# Patient Record
Sex: MALE | Race: WHITE | Employment: FULL TIME | URBAN - METROPOLITAN AREA
[De-identification: names, ages, dates, MRNs, and addresses within clinical notes are randomized per-mention and may not be internally consistent; named-entity substitution may affect disease eponyms.]

---

## 2019-05-23 ENCOUNTER — HOSPITAL ENCOUNTER (EMERGENCY)
Age: 19
Discharge: HOME | End: 2019-05-23
Payer: COMMERCIAL

## 2019-05-23 VITALS
SYSTOLIC BLOOD PRESSURE: 155 MMHG | HEIGHT: 70 IN | HEART RATE: 88 BPM | RESPIRATION RATE: 18 BRPM | BODY MASS INDEX: 33.64 KG/M2 | WEIGHT: 235 LBS | DIASTOLIC BLOOD PRESSURE: 92 MMHG | OXYGEN SATURATION: 99 % | TEMPERATURE: 97.4 F

## 2019-05-23 DIAGNOSIS — J01.90 ACUTE SINUSITIS, RECURRENCE NOT SPECIFIED, UNSPECIFIED LOCATION: Primary | ICD-10-CM

## 2019-05-23 PROCEDURE — 99283 EMERGENCY DEPT VISIT LOW MDM: CPT

## 2019-05-23 PROCEDURE — 6370000000 HC RX 637 (ALT 250 FOR IP): Performed by: PHYSICIAN ASSISTANT

## 2019-05-23 RX ORDER — PSEUDOEPHEDRINE HCL 120 MG/1
120 TABLET, FILM COATED, EXTENDED RELEASE ORAL EVERY MORNING
Qty: 5 TABLET | Refills: 0 | Status: SHIPPED | OUTPATIENT
Start: 2019-05-23 | End: 2019-05-28

## 2019-05-23 RX ORDER — PREDNISONE 10 MG/1
TABLET ORAL
Qty: 18 TABLET | Refills: 0 | Status: SHIPPED | OUTPATIENT
Start: 2019-05-23 | End: 2019-06-02

## 2019-05-23 RX ORDER — ACETAMINOPHEN 500 MG
1000 TABLET ORAL ONCE
Status: COMPLETED | OUTPATIENT
Start: 2019-05-23 | End: 2019-05-23

## 2019-05-23 RX ORDER — IBUPROFEN 600 MG/1
600 TABLET ORAL ONCE
Status: COMPLETED | OUTPATIENT
Start: 2019-05-23 | End: 2019-05-23

## 2019-05-23 RX ORDER — SULFACETAMIDE SODIUM 100 MG/ML
2 SOLUTION/ DROPS OPHTHALMIC 4 TIMES DAILY
Qty: 1 BOTTLE | Refills: 0 | Status: SHIPPED | OUTPATIENT
Start: 2019-05-23 | End: 2019-05-28

## 2019-05-23 RX ORDER — AMOXICILLIN AND CLAVULANATE POTASSIUM 875; 125 MG/1; MG/1
1 TABLET, FILM COATED ORAL 2 TIMES DAILY
Qty: 20 TABLET | Refills: 0 | Status: SHIPPED | OUTPATIENT
Start: 2019-05-23 | End: 2019-06-02

## 2019-05-23 RX ADMIN — IBUPROFEN 600 MG: 600 TABLET ORAL at 13:19

## 2019-05-23 RX ADMIN — ACETAMINOPHEN 1000 MG: 500 TABLET ORAL at 13:19

## 2019-05-23 ASSESSMENT — PAIN DESCRIPTION - PAIN TYPE: TYPE: ACUTE PAIN

## 2019-05-23 ASSESSMENT — PAIN DESCRIPTION - FREQUENCY: FREQUENCY: CONTINUOUS

## 2019-05-23 ASSESSMENT — PAIN DESCRIPTION - LOCATION: LOCATION: FACE

## 2019-05-23 ASSESSMENT — PAIN DESCRIPTION - DESCRIPTORS: DESCRIPTORS: ACHING

## 2019-05-23 ASSESSMENT — PAIN SCALES - GENERAL
PAINLEVEL_OUTOF10: 8
PAINLEVEL_OUTOF10: 7

## 2019-05-23 NOTE — ED PROVIDER NOTES
**[facility]  [facility]GENCY [facility]MENT [facility]NCOUNTER      Pt Name: Dayton Watt  EIV:[id number]  [place] 2000  Date of evaluation: 5/23/2019  Provider: Fabi Connolly PA-C      Chief Complaint:    Chief Complaint   Patient presents with    Sinusitis     pt states that he feels like he has had a sinus infection x 1 wk. Nursing Notes, Past Medical Hx, Past Surgical Hx, Social Hx, Allergies, and Family Hx were all reviewed and agreed with or any disagreements were addressed in the HPI.    HPI:  (Location, Duration, Timing, Severity,Quality, Assoc Sx, Context, Modifying factors)  This is a  22 y.o. male patient presenting with complaint of ongoing sinus congestion, postnasal drainage, frontal headache. States symptoms have progressed over the past week. No fevers or chills. Does have a frontal headache which is throbbing in character worse when he bends and leans forward. Also indicating some left conjunctival irritation and drainage with crusting this morning. Denies fevers, chills. No shown ship other chest pain. PastMedical/Surgical History:      Diagnosis Date    Asthma          Procedure Laterality Date    WISDOM TOOTH EXTRACTION         Medications:  Discharge Medication List as of 5/23/2019  1:09 PM            Review of Systems:  Review of Systems  Positives and Pertinent negatives as per HPI. Except as noted above in the ROS, problem specific ROS was completed and is negative. Physical Exam:  Physical Exam   Constitutional: He is oriented to person, place, and time. He appears well-developed and well-nourished. HENT:   Head: Normocephalic and atraumatic. Right Ear: Hearing, tympanic membrane, external ear and ear canal normal.   Left Ear: Hearing, tympanic membrane, external ear and ear canal normal.   Nose: Right sinus exhibits frontal sinus tenderness. Left sinus exhibits frontal sinus tenderness.    Eyes: Conjunctivae are normal. Right eye exhibits no discharge. Left eye exhibits discharge. No scleral icterus. No evidence of periorbital edema/swelling or erythema. Neck: Normal range of motion. Neck supple. No thyromegaly present. Cardiovascular: Normal rate, regular rhythm and normal heart sounds. Pulmonary/Chest: Effort normal and breath sounds normal.   Musculoskeletal: Normal range of motion. Lymphadenopathy:     He has no cervical adenopathy. Neurological: He is alert and oriented to person, place, and time. Skin: Skin is warm and dry. No rash noted. Psychiatric: He has a normal mood and affect. His behavior is normal. Judgment and thought content normal.   Nursing note and vitals reviewed. MEDICAL DECISION MAKING    Vitals:    Vitals:    05/23/19 1206   BP: (!) 155/92   Pulse: 88   Resp: 18   Temp: 97.4 °F (36.3 °C)   TempSrc: Oral   SpO2: 99%   Weight: 235 lb (106.6 kg)   Height: 5' 10\" (1.778 m)       LABS:Labs Reviewed - No data to display     Remainder of labs reviewed and werenegative at this time or not returned at the time of this note. RADIOLOGY:   Non-plain film images such as CT, Ultrasound and MRI are read by the radiologist. Don Merida PA-C have directly visualized the radiologic plain film image(s) with the below findings:    Not obtained    Interpretation per the Radiologist below, if available at the time of thisnote:    No orders to display        No results found. MEDICAL DECISION MAKING / ED COURSE:      PROCEDURES:   Procedures    None    Patient was given:  Medications   ibuprofen (ADVIL;MOTRIN) tablet 600 mg (600 mg Oral Given 5/23/19 1319)   acetaminophen (TYLENOL) tablet 1,000 mg (1,000 mg Oral Given 5/23/19 1319)       The patient presenting with what appears to be in acute sinusitis with associated or probable associated left conjunctivitis. I did prescribe Augmentin, prednisone, Bleph-10 eye drops and Sudafed.   The patient expresses understanding of his diagnosis and the treatment plan. I find no indication at this time to perform CT imaging. He will follow with his PCP if not improved over the next several days. The patient tolerated their visit well. I evaluated the patient. The physician was available for consultation as needed. The patient and / or the family were informed of the results of anytests, a time was given to answer questions, a plan was proposed and they agreed with plan. CLINICAL IMPRESSION:  1.  Acute sinusitis, recurrence not specified, unspecified location        DISPOSITION Decision To Discharge 05/23/2019 01:07:30 PM      PATIENT REFERRED TO:  [facility] [address]  [place] [facility]  Schedule an appointment as soon as possible for a visit in 4 week      [facility] ED  [address] [address]  [phone]  Go to   If symptoms worsen    [facility]) Pre-Services  [phone]  Schedule an appointment as soon as possible for a visit in 1 week        DISCHARGE MEDICATIONS:  Discharge Medication List as of 5/23/2019  1:09 PM      START taking these medications    Details   predniSONE (DELTASONE) 10 MG tablet 3 tabs po qam for 3 days then 2 tabs qam for 3 days the 1 tab qam for 3 days, Disp-18 tablet, R-0Print      pseudoephedrine (SUDAFED 12 HOUR) 120 MG extended release tablet Take 1 tablet by mouth every morning for 5 doses, Disp-5 tablet, R-0Print      amoxicillin-clavulanate (AUGMENTIN) 875-125 MG per tablet Take 1 tablet by mouth 2 times daily for 10 days, Disp-20 tablet, R-0Print      sulfacetamide (BLEPH-10) 10 % ophthalmic solution Place 2 drops into the left eye 4 times daily for 5 days, Disp-1 Bottle, R-0Print             DISCONTINUED MEDICATIONS:  Discharge Medication List as of 5/23/2019  1:09 PM                 (Please note the MDM and HPI sections of this note were completed with a voice recognition program.  Efforts weremade to edit the dictations but

## 2022-03-02 ENCOUNTER — HOSPITAL ENCOUNTER (EMERGENCY)
Age: 22
Discharge: HOME | End: 2022-03-02
Payer: COMMERCIAL

## 2022-03-02 VITALS
OXYGEN SATURATION: 99 % | WEIGHT: 240 LBS | TEMPERATURE: 97.5 F | DIASTOLIC BLOOD PRESSURE: 84 MMHG | SYSTOLIC BLOOD PRESSURE: 141 MMHG | HEART RATE: 72 BPM | RESPIRATION RATE: 16 BRPM | HEIGHT: 70 IN | BODY MASS INDEX: 34.36 KG/M2

## 2022-03-02 DIAGNOSIS — R03.0 ELEVATED BLOOD PRESSURE READING: ICD-10-CM

## 2022-03-02 DIAGNOSIS — S61.211A LACERATION OF LEFT INDEX FINGER W/O FOREIGN BODY W/O DAMAGE TO NAIL, INITIAL ENCOUNTER: Primary | ICD-10-CM

## 2022-03-02 PROCEDURE — 6370000000 HC RX 637 (ALT 250 FOR IP): Performed by: PHYSICIAN ASSISTANT

## 2022-03-02 PROCEDURE — 99284 EMERGENCY DEPT VISIT MOD MDM: CPT

## 2022-03-02 RX ORDER — CEPHALEXIN 500 MG/1
500 CAPSULE ORAL 4 TIMES DAILY
Qty: 28 CAPSULE | Refills: 0 | Status: SHIPPED | OUTPATIENT
Start: 2022-03-02 | End: 2022-03-09

## 2022-03-02 RX ORDER — CEPHALEXIN 500 MG/1
500 CAPSULE ORAL ONCE
Status: COMPLETED | OUTPATIENT
Start: 2022-03-02 | End: 2022-03-02

## 2022-03-02 RX ADMIN — CEPHALEXIN 500 MG: 500 CAPSULE ORAL at 19:43

## 2022-03-02 ASSESSMENT — ENCOUNTER SYMPTOMS: COLOR CHANGE: 0

## 2022-03-03 NOTE — ED PROVIDER NOTES
[address] ED  EMERGENCY DEPARTMENT ENCOUNTER        Pt Name: Kristy Serrano  MRN: [id number]  [place] 2000  Date of evaluation: 3/2/2022  Provider: Monroe Souza PA-C  PCP: No primary care provider on file. Shared Visit or Autonomous Visit: FAISAL. I have evaluated this patient. My supervising physician was available for consultation. CHIEF COMPLAINT       Chief Complaint   Patient presents with    Laceration     Patient has laceration to left 2nd finger from sheet metal duct work, bleeding controlled in triage. HISTORY OF PRESENT ILLNESS   (Location/Symptom, Timing/Onset, Context/Setting, Quality, Duration, Modifying Factors, Severity)  Note limiting factors. Kristy Serrano is a 23 y.o. male presenting to the emergency department for evaluation of left index finger laceration occurred approximately 5 hours ago from sheet-metal.  Bleeding controlled. Intact sensation. Strength 5 out of 5. Tetanus is up-to-date. The history is provided by the patient. Laceration  Location: left index finger. Length:  2cm  Depth: Through dermis  Quality: straight    Bleeding: controlled    Time since incident:  5 hours  Laceration mechanism:  Metal edge  Foreign body present:  No foreign bodies  Tetanus status:  Up to date  Associated symptoms: no fever, no focal weakness, no numbness, no redness and no swelling          Nursing Notes were reviewed    REVIEW OF SYSTEMS    (2-9 systems for level 4, 10 or more for level 5)     Review of Systems   Constitutional: Negative for fever. Skin: Positive for wound. Negative for color change. Neurological: Negative for focal weakness, weakness and numbness. Positives and Pertinent negatives as per HPI.        PAST MEDICAL HISTORY     Past Medical History:   Diagnosis Date    Asthma          SURGICAL HISTORY     Past Surgical History:   Procedure Laterality Date    WISDOM TOOTH EXTRACTION           CURRENTMEDICATIONS Previous Medications    No medications on file         ALLERGIES     Patient has no known allergies. FAMILYHISTORY     No family history on file. SOCIAL HISTORY       Social History     Socioeconomic History    Marital status: Single     Spouse name: Not on file    Number of children: Not on file    Years of education: Not on file    Highest education level: Not on file   Occupational History    Not on file   Tobacco Use    Smoking status: Former Smoker     Types: E-Cigarettes     Quit date: 2019     Years since quittin.8    Smokeless tobacco: Current User   Substance and Sexual Activity    Alcohol use: Yes     Comment: occasionally    Drug use: Yes     Types: Marijuana Lucas Free)    Sexual activity: Not on file   Other Topics Concern    Not on file   Social History Narrative    Not on file     Social Determinants of Health     Financial Resource Strain:     Difficulty of Paying Living Expenses: Not on file   Food Insecurity:     Worried About [employer] [employer] in the Last Year: Not on file    Cheryl of Food in the Last Year: Not on file   Transportation Needs:     Lack of Transportation (Medical): Not on file    Lack of Transportation (Non-Medical):  Not on file   Physical Activity:     Days of Exercise per Week: Not on file    Minutes of Exercise per Session: Not on file   Stress:     Feeling of Stress : Not on file   Social Connections:     Frequency of Communication with Friends and Family: Not on file    Frequency of Social Gatherings with Friends and Family: Not on file    Attends Sikhism Services: Not on file    Active Member of Clubs or Organizations: Not on file    Attends Club or Organization Meetings: Not on file    Marital Status: Not on file   Intimate Partner Violence:     Fear of Current or Ex-Partner: Not on file    Emotionally Abused: Not on file    Physically Abused: Not on file    Sexually Abused: Not on file   Housing Stability:     Unable to Pay for Housing in the Last Year: Not on file    Number of Places Lived in the Last Year: Not on file    Unstable Housing in the Last Year: Not on file       SCREENINGS    [place] Coma Scale  Eye Opening: Spontaneous  Best Verbal Response: Oriented  Best Motor Response: Obeys commands  [place] Coma Scale Score: 15        PHYSICAL EXAM    (up to 7 for level 4, 8 or more for level 5)     ED Triage Vitals [03/02/22 1855]   BP Temp Temp Source Pulse Resp SpO2 Height Weight   (!) 157/97 97.5 °F (36.4 °C) Temporal 78 15 100 % 5' 10\" (1.778 m) 240 lb (108.9 kg)       Physical Exam  Vitals and nursing note reviewed. Constitutional:       Appearance: He is well-developed. He is not ill-appearing or toxic-appearing. HENT:      Head: Normocephalic and atraumatic. Cardiovascular:      Pulses: Normal pulses. Radial pulses are 2+ on the left side. Pulmonary:      Effort: Pulmonary effort is normal.   Musculoskeletal:      Left hand: Laceration present. No deformity. Normal strength. Normal sensation. Normal capillary refill. Normal pulse. Hands:    Skin:     General: Skin is warm and dry. Capillary Refill: Capillary refill takes less than 2 seconds. Neurological:      Mental Status: He is alert and oriented to person, place, and time. Sensory: Sensation is intact. Motor: Motor function is intact. No abnormal muscle tone. Psychiatric:         Behavior: Behavior normal.         DIAGNOSTIC RESULTS   LABS:    Labs Reviewed - No data to display    All other labs were within normal range or not returned as of this dictation. EKG: All EKG's are interpreted by the Emergency Department Physician in the absence of a cardiologist.  Please see their note for interpretation of EKG.       RADIOLOGY:   Non-plain film images such as CT, Ultrasound and MRI are read by the radiologist. Plain radiographic images are visualized andpreliminarily interpreted by the  ED Provider with the below findings:        Interpretation perthe Radiologist below, if available at the time of this note:    No orders to display     No results found. PROCEDURES   Unless otherwise noted below, none     Lac Repair    Date/Time: 3/2/2022 7:38 PM  Performed by: Grecia Quintero PA-C  Authorized by: Grecia Quintero PA-C     Consent:     Consent obtained:  Verbal    Consent given by:  Patient    Risks discussed:  Infection, pain, poor cosmetic result and poor wound healing  Universal protocol:     Procedure explained and questions answered to patient or proxy's satisfaction: yes      Patient identity confirmed:  Verbally with patient  Anesthesia (see MAR for exact dosages):      Anesthesia method:  Nerve block    Block location:  Base of digit    Block needle gauge:  30 G    Block anesthetic:  Lidocaine 2% w/o epi (2cc)    Block injection procedure:  Anatomic landmarks identified and anatomic landmarks palpated    Block outcome:  Incomplete block  Laceration details:     Location:  Finger    Finger location:  L index finger    Length (cm):  2    Depth (mm):  2  Repair type:     Repair type:  Simple  Pre-procedure details:     Preparation:  Patient was prepped and draped in usual sterile fashion and imaging obtained to evaluate for foreign bodies  Exploration:     Wound exploration: entire depth of wound probed and visualized      Wound extent: no foreign bodies/material noted, no nerve damage noted, no tendon damage noted and no vascular damage noted      Contaminated: yes    Treatment:     Area cleansed with:  Hibiclens and saline    Amount of cleaning:  Standard    Irrigation solution:  Sterile saline    Irrigation method:  Syringe  Skin repair:     Repair method:  Sutures    Suture size:  5-0    Suture material:  Prolene    Suture technique:  Simple interrupted    Number of sutures:  3  Approximation:     Approximation:  Close  Post-procedure details:     Dressing:  Non-adherent dressing    Patient tolerance of procedure: Tolerated well, no immediate complications        CRITICAL CARE TIME   N/A    CONSULTS:  None      EMERGENCY DEPARTMENT COURSE and DIFFERENTIAL DIAGNOSIS/MDM:   Vitals:    Vitals:    03/02/22 1855   BP: (!) 157/97   Pulse: 78   Resp: 15   Temp: 97.5 °F (36.4 °C)   TempSrc: Temporal   SpO2: 100%   Weight: 240 lb (108.9 kg)   Height: 5' 10\" (1.778 m)       Patient was given thefollowing medications:  Medications   cephALEXin (KEFLEX) capsule 500 mg (has no administration in time range)         ED course  Patient presented to the ER for evaluation of left index finger laceration. He has 2 cm transverse laceration proximal dorsal left index finger. Bleeding controlled. Neurovascularly intact. His tetanus is already up-to-date. Wound was cleaned and irrigated and sutures placed. Suture removal in 10 days. Return for any worsening symptoms or signs of infection. I estimate there is LOW risk for COMPARTMENT SYNDROME, TENDON OR NEUROVASCULAR INJURY, OR FOREIGN BODY, thus I consider the discharge disposition reasonable. Also, there is no evidence or peritonitis, sepsis, or toxicity. FINAL IMPRESSION      1. Laceration of left index finger w/o foreign body w/o damage to nail, initial encounter    2.  Elevated blood pressure reading          DISPOSITION/PLAN   DISPOSITION     PATIENT REFERREDTO:  [facility]  [address] [facility]  In 10 days  For suture removal    [facility] ED  [address] [facility]  [phone]    If symptoms worsen      DISCHARGE MEDICATIONS:  New Prescriptions    CEPHALEXIN (KEFLEX) 500 MG CAPSULE    Take 1 capsule by mouth 4 times daily for 7 days       DISCONTINUED MEDICATIONS:  Discontinued Medications    No medications on file              (Please note that portions ofthis note were completed with a voice recognition program.  Efforts were made to edit the dictations but occasionally words are mis-transcribed.)    Jennie Chau PA-C (electronically signed)            Ryan Dunham PA-C  03/02/22 1939

## 2022-03-03 NOTE — ED NOTES
Devan LYNCH at bedside, patient's hand placed in water and chlorahexidene to soak.      Tyrone Childs RN  03/02/22 6987

## 2022-03-17 ENCOUNTER — APPOINTMENT (OUTPATIENT)
Dept: GENERAL RADIOLOGY | Age: 22
End: 2022-03-17
Payer: COMMERCIAL

## 2022-03-17 ENCOUNTER — HOSPITAL ENCOUNTER (EMERGENCY)
Age: 22
Discharge: HOME | End: 2022-03-17
Payer: COMMERCIAL

## 2022-03-17 VITALS
HEART RATE: 106 BPM | DIASTOLIC BLOOD PRESSURE: 73 MMHG | TEMPERATURE: 98.2 F | RESPIRATION RATE: 20 BRPM | OXYGEN SATURATION: 100 % | SYSTOLIC BLOOD PRESSURE: 140 MMHG

## 2022-03-17 DIAGNOSIS — M25.572 ACUTE LEFT ANKLE PAIN: Primary | ICD-10-CM

## 2022-03-17 PROCEDURE — 73610 X-RAY EXAM OF ANKLE: CPT

## 2022-03-17 PROCEDURE — 99283 EMERGENCY DEPT VISIT LOW MDM: CPT

## 2022-03-17 RX ORDER — NAPROXEN 500 MG/1
500 TABLET ORAL 2 TIMES DAILY WITH MEALS
Qty: 60 TABLET | Refills: 0 | Status: SHIPPED | OUTPATIENT
Start: 2022-03-17

## 2022-03-17 ASSESSMENT — ENCOUNTER SYMPTOMS
ABDOMINAL PAIN: 0
EYE PAIN: 0
DIARRHEA: 0
BLOOD IN STOOL: 0
BACK PAIN: 0
SORE THROAT: 0
NAUSEA: 0
COUGH: 0
VOMITING: 0
RHINORRHEA: 0
SHORTNESS OF BREATH: 0

## 2022-03-17 ASSESSMENT — PAIN DESCRIPTION - PAIN TYPE: TYPE: ACUTE PAIN

## 2022-03-17 ASSESSMENT — PAIN DESCRIPTION - ORIENTATION: ORIENTATION: LEFT

## 2022-03-17 ASSESSMENT — PAIN DESCRIPTION - LOCATION: LOCATION: ANKLE

## 2022-03-17 ASSESSMENT — PAIN SCALES - GENERAL: PAINLEVEL_OUTOF10: 3

## 2022-03-17 NOTE — Clinical Note
Mary Ann Juarez was seen and treated in our emergency department on 3/17/2022. He may return to work on 03/21/2022. If you have any questions or concerns, please don't hesitate to call.       Michael Rolon, APRN - CNP

## 2022-03-17 NOTE — ED PROVIDER NOTES
[facility]  ED  EMERGENCY DEPARTMENT ENCOUNTER        Pt Name: Arlene Amaral  MRN: [id number]  Shaun 2000  Date of evaluation: 3/17/2022  Provider: CAROLYN Bolanos CNP  PCP: No primary care provider on file. Note Started: 10:53 AM EDT      FAISAL. I have evaluated this patient. My supervising physician was available for consultation. Triage CHIEF COMPLAINT       Chief Complaint   Patient presents with    Ankle Pain     c/o left ankle pain for 3 days. no known injury. states \"woke up with the pain Tuesday morning\"         HISTORY OF PRESENT ILLNESS   (Location/Symptom, Timing/Onset, Context/Setting, Quality, Duration, Modifying Factors, Severity)  Note limiting factors. Chief Complaint: Left ankle pain    Arlene Amaral is a 23 y.o. male who presents to the emergency department symptoms of left ankle pain. Patient began with symptoms approximately 2 to 3 days ago. He states that he is really sure exactly what he did to his ankle but he awakened few days ago with pain involving the medial malleolus. Patient states that he has tenderness to the touch in that specific area. Denies any pain involving his Achilles or into his calf. No posterior knee pain. States that he otherwise has no other acute complaints and states otherwise feeling well. States that there is some mild bruising involving the area of tenderness in the left ankle. 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 Nursing Notes were all reviewed and agreed with or any disagreements were addressed in the HPI. REVIEW OF SYSTEMS    (2-9 systems for level 4, 10 or more for level 5)     Review of Systems   Constitutional: Negative for chills, diaphoresis and fever. HENT: Negative for congestion, ear pain, rhinorrhea and sore throat. Eyes: Negative for pain and visual disturbance.    Respiratory: Negative for cough and shortness of breath. Cardiovascular: Negative for chest pain and leg swelling. Gastrointestinal: Negative for abdominal pain, blood in stool, diarrhea, nausea and vomiting. Genitourinary: Negative for difficulty urinating, dysuria, flank pain and frequency. Musculoskeletal: Negative for back pain and neck pain. Left ankle pain   Skin: Negative for rash and wound. Neurological: Negative for dizziness and light-headedness. PAST MEDICAL HISTORY     Past Medical History:   Diagnosis Date    Asthma        SURGICAL HISTORY     Past Surgical History:   Procedure Laterality Date    WISDOM TOOTH EXTRACTION         CURRENTMEDICATIONS       Previous Medications    No medications on file       ALLERGIES     Patient has no known allergies. FAMILYHISTORY     History reviewed. No pertinent family history. SOCIAL HISTORY       Social History     Socioeconomic History    Marital status: Single     Spouse name: None    Number of children: None    Years of education: None    Highest education level: None   Occupational History    None   Tobacco Use    Smoking status: Former Smoker     Types: E-Cigarettes     Quit date: 2019     Years since quittin.8    Smokeless tobacco: Current User   Substance and Sexual Activity    Alcohol use: Yes     Comment: occasionally    Drug use: Yes     Types: Marijuana Dolph Matt)    Sexual activity: None   Other Topics Concern    None   Social History Narrative    None     Social Determinants of Health     Financial Resource Strain:     Difficulty of Paying Living Expenses: Not on file   Food Insecurity:     Worried About Running Out of Food in the Last Year: Not on file    Cheryl of Food in the Last Year: Not on file   Transportation Needs:     Lack of Transportation (Medical): Not on file    Lack of Transportation (Non-Medical):  Not on file   Physical Activity:     Days of Exercise per Week: Not on file    Minutes of Exercise per Session: Not on file   Stress:     Feeling of Stress : Not on file   Social Connections:     Frequency of Communication with Friends and Family: Not on file    Frequency of Social Gatherings with Friends and Family: Not on file    Attends [employer] Services: Not on file    Active Member of [address] [place] [address] [employer] or Organizations: Not on file    Attends Club or Organization Meetings: Not on file    Marital Status: Not on file   Intimate Partner Violence:     Fear of Current or Ex-Partner: Not on file    Emotionally Abused: Not on file    Physically Abused: Not on file    Sexually Abused: Not on file   Housing Stability:     Unable to Pay for Housing in the Last Year: Not on file    Number of [place] in the Last Year: Not on file    Unstable Housing in the Last Year: Not on file       SCREENINGS    [place] Coma Scale  Eye Opening: Spontaneous  Best Verbal Response: Oriented  Best Motor Response: Obeys commands  [place] Coma Scale Score: 15        PHYSICAL EXAM    (up to 7 for level 4, 8 or more for level 5)     ED Triage Vitals [03/17/22 0959]   BP Temp Temp Source Pulse Resp SpO2 Height Weight   (!) 140/73 98.2 °F (36.8 °C) Oral 106 20 100 % -- --       Physical Exam  Vitals and nursing note reviewed. Constitutional:       Appearance: Normal appearance. He is not toxic-appearing or diaphoretic. HENT:      Head: Normocephalic and atraumatic. Nose: Nose normal.   Eyes:      General:         Right eye: No discharge. Left eye: No discharge. Cardiovascular:      Rate and Rhythm: Normal rate and regular rhythm. Pulses: Normal pulses. Heart sounds: No murmur heard. Pulmonary:      Effort: Pulmonary effort is normal. No respiratory distress. Breath sounds: No wheezing or rhonchi. Abdominal:      Palpations: Abdomen is soft. Tenderness: There is no abdominal tenderness. There is no guarding or rebound. Musculoskeletal:         General: Normal range of motion.       Cervical back: Normal range of motion and neck supple. Left ankle: No swelling. Tenderness present over the medial malleolus. Normal range of motion. Anterior drawer test negative. Normal pulse. Left Achilles Tendon: Normal. No tenderness or defects. Cooney's test negative. Skin:     General: Skin is warm and dry. Neurological:      General: No focal deficit present. Mental Status: He is alert and oriented to person, place, and time. Psychiatric:         Mood and Affect: Mood normal.         Behavior: Behavior normal.         DIAGNOSTIC RESULTS   LABS:    Labs Reviewed - No data to display    When ordered, only abnormal lab results are displayed. All other labs were within normal range or not returned as of this dictation. EKG: When ordered, EKG's are interpreted by the Emergency Department Physician in the absence of a cardiologist.  Please see their note for interpretation of EKG. RADIOLOGY:   Non-plain film images such as CT, Ultrasound and MRI are read by the radiologist. Plain radiographic images are visualized andpreliminarily interpreted by the  ED Provider with the below findings:        Interpretation perthe Radiologist below, if available at the time of this note:    XR ANKLE LEFT (MIN 3 VIEWS)   Preliminary Result   No evidence of acute osseous abnormality. XR ANKLE LEFT (MIN 3 VIEWS)    Result Date: 3/17/2022  EXAMINATION: THREE XRAY VIEWS OF THE LEFT ANKLE 3/17/2022 10:31 am COMPARISON: None HISTORY: ORDERING SYSTEM PROVIDED HISTORY: Ankle pain TECHNOLOGIST PROVIDED HISTORY: Reason for exam:->Ankle pain Reason for Exam: left ankle pain, no known injury FINDINGS: There is suggestion of minimal spurring of the tip of the medial malleolus. No other significant bone or joint abnormality is identified. No evidence of acute fracture or dislocation. No evidence of acute osseous abnormality.          PROCEDURES   Unless otherwise noted below, none     Procedures    CRITICAL CARE TIME N/A    CONSULTS:  None      EMERGENCY DEPARTMENT COURSE and DIFFERENTIAL DIAGNOSIS/MDM:   Vitals:    Vitals:    03/17/22 0959   BP: (!) 140/73   Pulse: 106   Resp: 20   Temp: 98.2 °F (36.8 °C)   TempSrc: Oral   SpO2: 100%       Patient was given thefollowing medications:  Medications - No data to display        Patient to be discharged home in good condition. Patient appears well nontoxic. No other acute complaints. The patient has some mild tenderness involving the medial aspect of the left ankle. Patient has tenderness to palpation in that area. No tenderness along the Achilles. No calf tenderness or calf swelling. At this time is likely a light ankle sprain causing his symptoms. I did write for him to have RICE type treatment which includes Ace wrap, crutches, and anti-inflammatories. Verbalized understanding agrees with the treatment plan and discharge. FINAL IMPRESSION      1.  Acute left ankle pain          DISPOSITION/PLAN   DISPOSITION Discharge - Pending Orders Complete 03/17/2022 10:58:00 AM      PATIENT REFERREDTO:  Primary care provider  Follow your primary care provider in the next 2 to 3 days          DISCHARGE MEDICATIONS:  New Prescriptions    NAPROXEN (NAPROSYN) 500 MG TABLET    Take 1 tablet by mouth 2 times daily (with meals)       DISCONTINUED MEDICATIONS:  Discontinued Medications    No medications on file              (Please note that portions ofthis note were completed with a voice recognition program.  Efforts were made to edit the dictations but occasionally words are mis-transcribed.)    CAROLYN Wing CNP (electronically signed)            CAROLYN Wing CNP  03/17/22 CAROLYN Meraz CNP  03/17/22 3164